# Patient Record
Sex: FEMALE | Race: WHITE | NOT HISPANIC OR LATINO | URBAN - METROPOLITAN AREA
[De-identification: names, ages, dates, MRNs, and addresses within clinical notes are randomized per-mention and may not be internally consistent; named-entity substitution may affect disease eponyms.]

---

## 2017-01-03 ENCOUNTER — EMERGENCY (EMERGENCY)
Facility: HOSPITAL | Age: 52
LOS: 1 days | Discharge: HOME | End: 2017-01-03
Attending: EMERGENCY MEDICINE | Admitting: EMERGENCY MEDICINE
Payer: COMMERCIAL

## 2017-01-03 VITALS
DIASTOLIC BLOOD PRESSURE: 78 MMHG | OXYGEN SATURATION: 97 % | HEART RATE: 62 BPM | SYSTOLIC BLOOD PRESSURE: 109 MMHG | RESPIRATION RATE: 18 BRPM | TEMPERATURE: 98 F

## 2017-01-03 VITALS
HEART RATE: 76 BPM | RESPIRATION RATE: 20 BRPM | OXYGEN SATURATION: 100 % | TEMPERATURE: 98 F | DIASTOLIC BLOOD PRESSURE: 87 MMHG | SYSTOLIC BLOOD PRESSURE: 130 MMHG

## 2017-01-03 DIAGNOSIS — R07.89 OTHER CHEST PAIN: ICD-10-CM

## 2017-01-03 LAB
ALBUMIN SERPL ELPH-MCNC: 4.4 G/DL — SIGNIFICANT CHANGE UP (ref 3.3–5)
ALP SERPL-CCNC: 121 U/L — HIGH (ref 40–120)
ALT FLD-CCNC: 18 U/L RC — SIGNIFICANT CHANGE UP (ref 10–45)
ANION GAP SERPL CALC-SCNC: 14 MMOL/L — SIGNIFICANT CHANGE UP (ref 5–17)
APTT BLD: 32.9 SEC — SIGNIFICANT CHANGE UP (ref 27.5–37.4)
AST SERPL-CCNC: 19 U/L — SIGNIFICANT CHANGE UP (ref 10–40)
BASOPHILS # BLD AUTO: 0.1 K/UL — SIGNIFICANT CHANGE UP (ref 0–0.2)
BASOPHILS NFR BLD AUTO: 1.1 % — SIGNIFICANT CHANGE UP (ref 0–2)
BILIRUB SERPL-MCNC: 0.3 MG/DL — SIGNIFICANT CHANGE UP (ref 0.2–1.2)
BUN SERPL-MCNC: 25 MG/DL — HIGH (ref 7–23)
CALCIUM SERPL-MCNC: 9.6 MG/DL — SIGNIFICANT CHANGE UP (ref 8.4–10.5)
CHLORIDE SERPL-SCNC: 100 MMOL/L — SIGNIFICANT CHANGE UP (ref 96–108)
CK MB BLD-MCNC: 1.4 % — SIGNIFICANT CHANGE UP (ref 0–3.5)
CK MB BLD-MCNC: 1.4 % — SIGNIFICANT CHANGE UP (ref 0–3.5)
CK MB CFR SERPL CALC: 2.2 NG/ML — SIGNIFICANT CHANGE UP (ref 0–3.8)
CK MB CFR SERPL CALC: 2.4 NG/ML — SIGNIFICANT CHANGE UP (ref 0–3.8)
CK SERPL-CCNC: 159 U/L — SIGNIFICANT CHANGE UP (ref 25–170)
CK SERPL-CCNC: 175 U/L — HIGH (ref 25–170)
CO2 SERPL-SCNC: 26 MMOL/L — SIGNIFICANT CHANGE UP (ref 22–31)
CREAT SERPL-MCNC: 0.96 MG/DL — SIGNIFICANT CHANGE UP (ref 0.5–1.3)
EOSINOPHIL # BLD AUTO: 0.2 K/UL — SIGNIFICANT CHANGE UP (ref 0–0.5)
EOSINOPHIL NFR BLD AUTO: 2.5 % — SIGNIFICANT CHANGE UP (ref 0–6)
GAS PNL BLDV: SIGNIFICANT CHANGE UP
GLUCOSE SERPL-MCNC: 147 MG/DL — HIGH (ref 70–99)
HCT VFR BLD CALC: 40.4 % — SIGNIFICANT CHANGE UP (ref 34.5–45)
HGB BLD-MCNC: 13.8 G/DL — SIGNIFICANT CHANGE UP (ref 11.5–15.5)
INR BLD: 1.05 RATIO — SIGNIFICANT CHANGE UP (ref 0.88–1.16)
LYMPHOCYTES # BLD AUTO: 3 K/UL — SIGNIFICANT CHANGE UP (ref 1–3.3)
LYMPHOCYTES # BLD AUTO: 37.9 % — SIGNIFICANT CHANGE UP (ref 13–44)
MCHC RBC-ENTMCNC: 30.3 PG — SIGNIFICANT CHANGE UP (ref 27–34)
MCHC RBC-ENTMCNC: 34.1 GM/DL — SIGNIFICANT CHANGE UP (ref 32–36)
MCV RBC AUTO: 88.6 FL — SIGNIFICANT CHANGE UP (ref 80–100)
MONOCYTES # BLD AUTO: 0.7 K/UL — SIGNIFICANT CHANGE UP (ref 0–0.9)
MONOCYTES NFR BLD AUTO: 9 % — SIGNIFICANT CHANGE UP (ref 2–14)
NEUTROPHILS # BLD AUTO: 3.9 K/UL — SIGNIFICANT CHANGE UP (ref 1.8–7.4)
NEUTROPHILS NFR BLD AUTO: 49.6 % — SIGNIFICANT CHANGE UP (ref 43–77)
NT-PROBNP SERPL-SCNC: 38 PG/ML — SIGNIFICANT CHANGE UP (ref 0–300)
PLATELET # BLD AUTO: 235 K/UL — SIGNIFICANT CHANGE UP (ref 150–400)
POTASSIUM SERPL-MCNC: 4.3 MMOL/L — SIGNIFICANT CHANGE UP (ref 3.5–5.3)
POTASSIUM SERPL-SCNC: 4.3 MMOL/L — SIGNIFICANT CHANGE UP (ref 3.5–5.3)
PROT SERPL-MCNC: 7.2 G/DL — SIGNIFICANT CHANGE UP (ref 6–8.3)
PROTHROM AB SERPL-ACNC: 11.4 SEC — SIGNIFICANT CHANGE UP (ref 10–13.1)
RBC # BLD: 4.55 M/UL — SIGNIFICANT CHANGE UP (ref 3.8–5.2)
RBC # FLD: 11.9 % — SIGNIFICANT CHANGE UP (ref 10.3–14.5)
SODIUM SERPL-SCNC: 140 MMOL/L — SIGNIFICANT CHANGE UP (ref 135–145)
TROPONIN T SERPL-MCNC: <0.01 NG/ML — SIGNIFICANT CHANGE UP (ref 0–0.06)
TROPONIN T SERPL-MCNC: <0.01 NG/ML — SIGNIFICANT CHANGE UP (ref 0–0.06)
WBC # BLD: 7.9 K/UL — SIGNIFICANT CHANGE UP (ref 3.8–10.5)
WBC # FLD AUTO: 7.9 K/UL — SIGNIFICANT CHANGE UP (ref 3.8–10.5)

## 2017-01-03 PROCEDURE — 85730 THROMBOPLASTIN TIME PARTIAL: CPT

## 2017-01-03 PROCEDURE — 80053 COMPREHEN METABOLIC PANEL: CPT

## 2017-01-03 PROCEDURE — 71010: CPT | Mod: 26

## 2017-01-03 PROCEDURE — 85610 PROTHROMBIN TIME: CPT

## 2017-01-03 PROCEDURE — 99284 EMERGENCY DEPT VISIT MOD MDM: CPT | Mod: 25

## 2017-01-03 PROCEDURE — 83880 ASSAY OF NATRIURETIC PEPTIDE: CPT

## 2017-01-03 PROCEDURE — 93005 ELECTROCARDIOGRAM TRACING: CPT

## 2017-01-03 PROCEDURE — 71045 X-RAY EXAM CHEST 1 VIEW: CPT

## 2017-01-03 PROCEDURE — 85014 HEMATOCRIT: CPT

## 2017-01-03 PROCEDURE — 85027 COMPLETE CBC AUTOMATED: CPT

## 2017-01-03 PROCEDURE — 82550 ASSAY OF CK (CPK): CPT

## 2017-01-03 PROCEDURE — 82553 CREATINE MB FRACTION: CPT

## 2017-01-03 PROCEDURE — 82803 BLOOD GASES ANY COMBINATION: CPT

## 2017-01-03 PROCEDURE — 84132 ASSAY OF SERUM POTASSIUM: CPT

## 2017-01-03 PROCEDURE — 99285 EMERGENCY DEPT VISIT HI MDM: CPT | Mod: 25

## 2017-01-03 PROCEDURE — 82947 ASSAY GLUCOSE BLOOD QUANT: CPT

## 2017-01-03 PROCEDURE — 93010 ELECTROCARDIOGRAM REPORT: CPT

## 2017-01-03 PROCEDURE — 84484 ASSAY OF TROPONIN QUANT: CPT

## 2017-01-03 PROCEDURE — 83605 ASSAY OF LACTIC ACID: CPT

## 2017-01-03 PROCEDURE — 82330 ASSAY OF CALCIUM: CPT

## 2017-01-03 PROCEDURE — 82435 ASSAY OF BLOOD CHLORIDE: CPT

## 2017-01-03 PROCEDURE — 84295 ASSAY OF SERUM SODIUM: CPT

## 2017-01-03 NOTE — ED ADULT NURSE NOTE - OBJECTIVE STATEMENT
51 y.o. Female presents to the ED via EMS c/o L chest and L arm discomfort x3 days. Hx asthma and GERD. 2 baby aspirins were given by EMS PTA. Pt reports feeling "discomfort on and off" for the past 2-3 days. Claims feeling a "weird feeling" on the L arm. Pt describes the feeling as a combinations of "aching and burning" feeling. Also states feeling "indigestion" when laying down. Denies pain, SOB, N/V/D, urinary/bowel complications, fever/chills. Pt is in no current distress. Comfort and safety provided.

## 2017-01-03 NOTE — ED PROVIDER NOTE - ATTENDING CONTRIBUTION TO CARE
I was physically present for the E/M service provided. I agree with above history, physical, and plan which I have reviewed and edited where appropriate. I was physically present for the key portions of the service provided.    see mdm

## 2017-01-03 NOTE — ED PROVIDER NOTE - PLAN OF CARE
Follow up with PMD in 1-2 days.  Follow up with cardiology as outpatient at phone number provided. Use tylenol as needed for pain. Tylenol 1000mg every 8 hours.  Read all handouts provided. Return to ED if you have worsening pain or further concerns.

## 2017-01-03 NOTE — ED ADULT NURSE NOTE - CHPI ED SYMPTOMS NEG
no shortness of breath/no diaphoresis/no edema/no cough/no fever/no nausea/no loss of consciousness/no palpitations/no back pain/no chills/no vomiting/no jaw pain no fever/no vomiting/no palpitations/no left arm pain/no cough/no edema/no numbness/no back pain/no loss of consciousness/no nausea/no shortness of breath/no chills/no jaw pain/no diaphoresis

## 2017-01-03 NOTE — ED ADULT NURSE REASSESSMENT NOTE - NS ED NURSE REASSESS COMMENT FT1
Pt is resting comfortably in bed. Denies pain/discomfort at this time. NAD noted. Family at bedside. Plan of care discussed w/ pt. Safety maintained.

## 2017-01-03 NOTE — ED ADULT NURSE REASSESSMENT NOTE - NS ED NURSE REASSESS COMMENT FT1
Received pt from Sherlyn GILLIAM. Pt is resting comfortably in bed. Pt states CP is intermittent and states it's a "weird feeling". Denies pain/discomfort at this time. Denies diff breathing/SOB. CM in place - NSR. B/L lungs clear, resp even, unlabored. NAD noted. VSS. Afebrile. #18G LAC, patent, no signs of redness/swelling noted to site. Safety maintained. Plan of care discussed w/ pt.

## 2017-01-03 NOTE — ED PROVIDER NOTE - MEDICAL DECISION MAKING DETAILS
50 y/o female p/w left side chest pain described as stabbing sensation which lasted a few seconds PTA and is now resolved. h/o GERD, no h/o HTN, HL or DM. EKG non-ischemic, troponin WNL x2, CXR clear. f/u [facility].

## 2017-01-03 NOTE — ED PROVIDER NOTE - PROGRESS NOTE DETAILS
Pt signed out to me. Second troponin negative.  Pt stable for dc home with cardiology follow up. -polina hayden

## 2017-01-03 NOTE — ED PROVIDER NOTE - CARE PLAN
Instructions for follow-up, activity and diet:	Follow up with PMD in 1-2 days.  Follow up with cardiology as outpatient at phone number provided. Use tylenol as needed for pain. Tylenol 1000mg every 8 hours.  Read all handouts provided. Return to ED if you have worsening pain or further concerns. Principal Discharge DX:	Chest pain  Instructions for follow-up, activity and diet:	Follow up with PMD in 1-2 days.  Follow up with cardiology as outpatient at phone number provided. Use tylenol as needed for pain. Tylenol 1000mg every 8 hours.  Read all handouts provided. Return to ED if you have worsening pain or further concerns.

## 2017-01-03 NOTE — ED PROVIDER NOTE - OBJECTIVE STATEMENT
51yF former smoker presents with L sided chest pain, burning in nature, intermittent x 2 days. Became dizzy when lying flat tonight so called EMS. Given ASA by EMS. No active chest pain. No SOB. Last stress test 3 years ago, reportedly negative. No exogenous estrogen, recent surgery or travel, h/o DVT/PE. Chest pain is not pleuritic.   PMD Rah Severino

## 2019-05-21 NOTE — ED ADULT NURSE NOTE - NS ED NURSE RECORD ANOTHER HT AND WT
"LABOR NOTE    S:  Complaints: No.  Epidural working:  yes  MD to bedside for routine cervical check    O: /81 (BP Location: Right arm, Patient Position: Lying)   Pulse 76   Temp 97.4 °F (36.3 °C) (Temporal)   Resp 16   Ht 5' 9" (1.753 m)   Wt 89.2 kg (196 lb 10.4 oz)   LMP 2018   SpO2 100%   Breastfeeding? Yes   BMI 29.04 kg/m²       FHT: 130, Cat 1 (reassuring), mod BTBV, +accels, no decels  CTX: q 2 minutes  SVE: 3/80/-2  IUPC in place    ASSESSMENT:   28 y.o.  at 38w0d, IOL    FHT reassuring    Active Hospital Problems    Diagnosis  POA    37 weeks gestation of pregnancy [Z3A.37]  Not Applicable    Gestational hypertension, third trimester [O13.3]  Unknown      Resolved Hospital Problems   No resolved problems to display.     Labor course:  0600: ft/thi/hi  0845: /-3  0945: -3, SROM clr, pit @ 18 mU/min  1200: -3, IUPC placed without difficulty, pit @ 18, MVUs 180  1400: 3/80/-2, IUPC in place, pit @ 20 mU/min, MVUs at 130s-160s    PLAN:    Continue Close Maternal/Fetal Monitoring  Pitocin Augmentation per protocol, titrate until MVUs> 200  Recheck 2 hours or PRN    Liyah Urias MD  OB/GYN  PGY-1    " No

## 2019-07-26 NOTE — ED ADULT NURSE NOTE - QUALITY
Consent (Nose)/Introductory Paragraph: The rationale for Mohs was explained to the patient and consent was obtained. The risks, benefits and alternatives to therapy were discussed in detail. Specifically, the risks of nasal deformity, changes in the flow of air through the nose, infection, scarring, bleeding, prolonged wound healing, incomplete removal, allergy to anesthesia, nerve injury and recurrence were addressed. Prior to the procedure, the treatment site was clearly identified and confirmed by the patient. All components of Universal Protocol/PAUSE Rule completed. aching/burning

## 2020-04-22 ENCOUNTER — RESULT REVIEW (OUTPATIENT)
Age: 55
End: 2020-04-22

## 2021-07-20 ENCOUNTER — RESULT REVIEW (OUTPATIENT)
Age: 56
End: 2021-07-20

## 2021-10-03 ENCOUNTER — EMERGENCY (EMERGENCY)
Facility: HOSPITAL | Age: 56
LOS: 1 days | Discharge: HOME | End: 2021-10-03
Attending: EMERGENCY MEDICINE
Payer: COMMERCIAL

## 2021-10-03 VITALS
RESPIRATION RATE: 18 BRPM | TEMPERATURE: 100 F | SYSTOLIC BLOOD PRESSURE: 115 MMHG | OXYGEN SATURATION: 96 % | WEIGHT: 147.05 LBS | DIASTOLIC BLOOD PRESSURE: 68 MMHG | HEART RATE: 94 BPM | HEIGHT: 62 IN

## 2021-10-03 VITALS
TEMPERATURE: 99 F | RESPIRATION RATE: 16 BRPM | DIASTOLIC BLOOD PRESSURE: 76 MMHG | HEART RATE: 98 BPM | OXYGEN SATURATION: 97 % | SYSTOLIC BLOOD PRESSURE: 120 MMHG

## 2021-10-03 LAB
ALBUMIN SERPL ELPH-MCNC: 4.6 G/DL — SIGNIFICANT CHANGE UP (ref 3.3–5)
ALP SERPL-CCNC: 100 U/L — SIGNIFICANT CHANGE UP (ref 40–120)
ALT FLD-CCNC: 10 U/L — SIGNIFICANT CHANGE UP (ref 10–45)
ANION GAP SERPL CALC-SCNC: 15 MMOL/L — SIGNIFICANT CHANGE UP (ref 5–17)
APPEARANCE UR: CLEAR — SIGNIFICANT CHANGE UP
AST SERPL-CCNC: 17 U/L — SIGNIFICANT CHANGE UP (ref 10–40)
BASE EXCESS BLDV CALC-SCNC: 2 MMOL/L — SIGNIFICANT CHANGE UP (ref -2–2)
BASOPHILS # BLD AUTO: 0.15 K/UL — SIGNIFICANT CHANGE UP (ref 0–0.2)
BASOPHILS NFR BLD AUTO: 0.9 % — SIGNIFICANT CHANGE UP (ref 0–2)
BILIRUB SERPL-MCNC: 0.8 MG/DL — SIGNIFICANT CHANGE UP (ref 0.2–1.2)
BILIRUB UR-MCNC: NEGATIVE — SIGNIFICANT CHANGE UP
BUN SERPL-MCNC: 16 MG/DL — SIGNIFICANT CHANGE UP (ref 7–23)
CA-I SERPL-SCNC: 1.21 MMOL/L — SIGNIFICANT CHANGE UP (ref 1.15–1.33)
CALCIUM SERPL-MCNC: 9.7 MG/DL — SIGNIFICANT CHANGE UP (ref 8.4–10.5)
CHLORIDE BLDV-SCNC: 99 MMOL/L — SIGNIFICANT CHANGE UP (ref 96–108)
CHLORIDE SERPL-SCNC: 99 MMOL/L — SIGNIFICANT CHANGE UP (ref 96–108)
CO2 BLDV-SCNC: 29 MMOL/L — HIGH (ref 22–26)
CO2 SERPL-SCNC: 22 MMOL/L — SIGNIFICANT CHANGE UP (ref 22–31)
COLOR SPEC: COLORLESS — SIGNIFICANT CHANGE UP
CREAT SERPL-MCNC: 0.84 MG/DL — SIGNIFICANT CHANGE UP (ref 0.5–1.3)
D DIMER BLD IA.RAPID-MCNC: <150 NG/ML DDU — SIGNIFICANT CHANGE UP
DIFF PNL FLD: NEGATIVE — SIGNIFICANT CHANGE UP
EOSINOPHIL # BLD AUTO: 0 K/UL — SIGNIFICANT CHANGE UP (ref 0–0.5)
EOSINOPHIL NFR BLD AUTO: 0 % — SIGNIFICANT CHANGE UP (ref 0–6)
GAS PNL BLDV: 133 MMOL/L — LOW (ref 136–145)
GAS PNL BLDV: SIGNIFICANT CHANGE UP
GLUCOSE BLDV-MCNC: 138 MG/DL — HIGH (ref 70–99)
GLUCOSE SERPL-MCNC: 137 MG/DL — HIGH (ref 70–99)
GLUCOSE UR QL: NEGATIVE — SIGNIFICANT CHANGE UP
HCG UR QL: NEGATIVE — SIGNIFICANT CHANGE UP
HCO3 BLDV-SCNC: 28 MMOL/L — SIGNIFICANT CHANGE UP (ref 22–29)
HCT VFR BLD CALC: 40.6 % — SIGNIFICANT CHANGE UP (ref 34.5–45)
HCT VFR BLDA CALC: 41 % — SIGNIFICANT CHANGE UP (ref 34.5–46.5)
HGB BLD CALC-MCNC: 13.7 G/DL — SIGNIFICANT CHANGE UP (ref 11.7–16.1)
HGB BLD-MCNC: 13.3 G/DL — SIGNIFICANT CHANGE UP (ref 11.5–15.5)
HIV 1 & 2 AB SERPL IA.RAPID: SIGNIFICANT CHANGE UP
KETONES UR-MCNC: ABNORMAL
LACTATE BLDV-MCNC: 1.3 MMOL/L — SIGNIFICANT CHANGE UP (ref 0.7–2)
LEUKOCYTE ESTERASE UR-ACNC: NEGATIVE — SIGNIFICANT CHANGE UP
LIDOCAIN IGE QN: 14 U/L — SIGNIFICANT CHANGE UP (ref 7–60)
LYMPHOCYTES # BLD AUTO: 0.28 K/UL — LOW (ref 1–3.3)
LYMPHOCYTES # BLD AUTO: 1.7 % — LOW (ref 13–44)
MANUAL SMEAR VERIFICATION: SIGNIFICANT CHANGE UP
MCHC RBC-ENTMCNC: 28.7 PG — SIGNIFICANT CHANGE UP (ref 27–34)
MCHC RBC-ENTMCNC: 32.8 GM/DL — SIGNIFICANT CHANGE UP (ref 32–36)
MCV RBC AUTO: 87.5 FL — SIGNIFICANT CHANGE UP (ref 80–100)
MONOCYTES # BLD AUTO: 0.15 K/UL — SIGNIFICANT CHANGE UP (ref 0–0.9)
MONOCYTES NFR BLD AUTO: 0.9 % — LOW (ref 2–14)
NEUTROPHILS # BLD AUTO: 15.73 K/UL — HIGH (ref 1.8–7.4)
NEUTROPHILS NFR BLD AUTO: 96.5 % — HIGH (ref 43–77)
NITRITE UR-MCNC: NEGATIVE — SIGNIFICANT CHANGE UP
NT-PROBNP SERPL-SCNC: 73 PG/ML — SIGNIFICANT CHANGE UP (ref 0–300)
PCO2 BLDV: 47 MMHG — HIGH (ref 39–42)
PH BLDV: 7.38 — SIGNIFICANT CHANGE UP (ref 7.32–7.43)
PH UR: 6.5 — SIGNIFICANT CHANGE UP (ref 5–8)
PLAT MORPH BLD: NORMAL — SIGNIFICANT CHANGE UP
PLATELET # BLD AUTO: 226 K/UL — SIGNIFICANT CHANGE UP (ref 150–400)
PO2 BLDV: 22 MMHG — LOW (ref 25–45)
POTASSIUM BLDV-SCNC: 4.2 MMOL/L — SIGNIFICANT CHANGE UP (ref 3.5–5.1)
POTASSIUM SERPL-MCNC: 4.3 MMOL/L — SIGNIFICANT CHANGE UP (ref 3.5–5.3)
POTASSIUM SERPL-SCNC: 4.3 MMOL/L — SIGNIFICANT CHANGE UP (ref 3.5–5.3)
PROT SERPL-MCNC: 7.4 G/DL — SIGNIFICANT CHANGE UP (ref 6–8.3)
PROT UR-MCNC: NEGATIVE — SIGNIFICANT CHANGE UP
RBC # BLD: 4.64 M/UL — SIGNIFICANT CHANGE UP (ref 3.8–5.2)
RBC # FLD: 12.6 % — SIGNIFICANT CHANGE UP (ref 10.3–14.5)
RBC BLD AUTO: NORMAL — SIGNIFICANT CHANGE UP
SAO2 % BLDV: 37.4 % — LOW (ref 67–88)
SARS-COV-2 RNA SPEC QL NAA+PROBE: SIGNIFICANT CHANGE UP
SODIUM SERPL-SCNC: 136 MMOL/L — SIGNIFICANT CHANGE UP (ref 135–145)
SP GR SPEC: 1.04 — HIGH (ref 1.01–1.02)
TROPONIN T, HIGH SENSITIVITY RESULT: <6 NG/L — SIGNIFICANT CHANGE UP (ref 0–51)
UROBILINOGEN FLD QL: NEGATIVE — SIGNIFICANT CHANGE UP
WBC # BLD: 16.3 K/UL — HIGH (ref 3.8–10.5)
WBC # FLD AUTO: 16.3 K/UL — HIGH (ref 3.8–10.5)

## 2021-10-03 PROCEDURE — 85018 HEMOGLOBIN: CPT

## 2021-10-03 PROCEDURE — 86703 HIV-1/HIV-2 1 RESULT ANTBDY: CPT

## 2021-10-03 PROCEDURE — 85379 FIBRIN DEGRADATION QUANT: CPT

## 2021-10-03 PROCEDURE — 93005 ELECTROCARDIOGRAM TRACING: CPT

## 2021-10-03 PROCEDURE — U0005: CPT

## 2021-10-03 PROCEDURE — 74177 CT ABD & PELVIS W/CONTRAST: CPT | Mod: MA

## 2021-10-03 PROCEDURE — 84484 ASSAY OF TROPONIN QUANT: CPT

## 2021-10-03 PROCEDURE — 84132 ASSAY OF SERUM POTASSIUM: CPT

## 2021-10-03 PROCEDURE — 87086 URINE CULTURE/COLONY COUNT: CPT

## 2021-10-03 PROCEDURE — 81003 URINALYSIS AUTO W/O SCOPE: CPT

## 2021-10-03 PROCEDURE — 81025 URINE PREGNANCY TEST: CPT

## 2021-10-03 PROCEDURE — 71045 X-RAY EXAM CHEST 1 VIEW: CPT | Mod: 26

## 2021-10-03 PROCEDURE — 82947 ASSAY GLUCOSE BLOOD QUANT: CPT

## 2021-10-03 PROCEDURE — 99284 EMERGENCY DEPT VISIT MOD MDM: CPT | Mod: 25

## 2021-10-03 PROCEDURE — 85025 COMPLETE CBC W/AUTO DIFF WBC: CPT

## 2021-10-03 PROCEDURE — 99285 EMERGENCY DEPT VISIT HI MDM: CPT | Mod: CS

## 2021-10-03 PROCEDURE — 96374 THER/PROPH/DIAG INJ IV PUSH: CPT | Mod: XU

## 2021-10-03 PROCEDURE — 83880 ASSAY OF NATRIURETIC PEPTIDE: CPT

## 2021-10-03 PROCEDURE — 96375 TX/PRO/DX INJ NEW DRUG ADDON: CPT

## 2021-10-03 PROCEDURE — 71045 X-RAY EXAM CHEST 1 VIEW: CPT

## 2021-10-03 PROCEDURE — 85014 HEMATOCRIT: CPT

## 2021-10-03 PROCEDURE — 82435 ASSAY OF BLOOD CHLORIDE: CPT

## 2021-10-03 PROCEDURE — 93010 ELECTROCARDIOGRAM REPORT: CPT

## 2021-10-03 PROCEDURE — 74177 CT ABD & PELVIS W/CONTRAST: CPT | Mod: 26,MA

## 2021-10-03 PROCEDURE — 36415 COLL VENOUS BLD VENIPUNCTURE: CPT

## 2021-10-03 PROCEDURE — 84295 ASSAY OF SERUM SODIUM: CPT

## 2021-10-03 PROCEDURE — U0003: CPT

## 2021-10-03 PROCEDURE — 82330 ASSAY OF CALCIUM: CPT

## 2021-10-03 PROCEDURE — 80053 COMPREHEN METABOLIC PANEL: CPT

## 2021-10-03 PROCEDURE — 82803 BLOOD GASES ANY COMBINATION: CPT

## 2021-10-03 PROCEDURE — 83605 ASSAY OF LACTIC ACID: CPT

## 2021-10-03 PROCEDURE — 83690 ASSAY OF LIPASE: CPT

## 2021-10-03 RX ORDER — METRONIDAZOLE 500 MG
1 TABLET ORAL
Qty: 30 | Refills: 0
Start: 2021-10-03 | End: 2021-10-12

## 2021-10-03 RX ORDER — SODIUM CHLORIDE 9 MG/ML
1000 INJECTION INTRAMUSCULAR; INTRAVENOUS; SUBCUTANEOUS ONCE
Refills: 0 | Status: COMPLETED | OUTPATIENT
Start: 2021-10-03 | End: 2021-10-03

## 2021-10-03 RX ORDER — METRONIDAZOLE 500 MG
500 TABLET ORAL ONCE
Refills: 0 | Status: COMPLETED | OUTPATIENT
Start: 2021-10-03 | End: 2021-10-03

## 2021-10-03 RX ORDER — CIPROFLOXACIN LACTATE 400MG/40ML
400 VIAL (ML) INTRAVENOUS ONCE
Refills: 0 | Status: COMPLETED | OUTPATIENT
Start: 2021-10-03 | End: 2021-10-03

## 2021-10-03 RX ORDER — CIPROFLOXACIN LACTATE 400MG/40ML
1 VIAL (ML) INTRAVENOUS
Qty: 20 | Refills: 0
Start: 2021-10-03 | End: 2021-10-12

## 2021-10-03 RX ADMIN — Medication 100 MILLIGRAM(S): at 21:39

## 2021-10-03 RX ADMIN — Medication 200 MILLIGRAM(S): at 22:24

## 2021-10-03 RX ADMIN — SODIUM CHLORIDE 1000 MILLILITER(S): 9 INJECTION INTRAMUSCULAR; INTRAVENOUS; SUBCUTANEOUS at 18:09

## 2021-10-03 NOTE — ED ADULT NURSE NOTE - OBJECTIVE STATEMENT
57 y/o female PMHX GERD, DM, presents with complaints of SOB since this morning. pt states she feels it is hard to breath and feels something is in her throat. denies any CP or Chest tightness. pt states she went to urgent care yesterday for urinary symptoms sent home with antibiotics. pt states she has mid abdominal pain radiating to bilateral flank, denies burning urination but states she feels irritation when urinating. states she had 1 episode of diarrhea today in the morning, and chills, denies any interventions. pt placed on cardiac monitor, safety precautions in place call bell in reach.

## 2021-10-03 NOTE — ED ADULT NURSE NOTE - ISOLATION TYPE:
Dominique Bass comes to the clinic for follow up anticoagulation management visit.    Patient denies any complaints related to anticoagulation therapy at this time. Patient reports no change in medication, diet or health. Reinforced with patient to call clinic with any medication changes as this can impact INR. Reinforced signs and symptoms bleeding/clotting with patient. Patient aware to seek medical care if signs and symptoms develop. Advised that if patient falls and/or hits their head, they should seek medical attention. Verbalized understanding.     Dosing instructions given to patient both verbally and in writing. Advised to call the clinic with any questions or concerns. Patient verbalized understanding. Clinic number provided.    Anticoagulation Summary  As of 2019    INR goal:   2.0-3.0   TTR:   81.6 % (3.5 y)   INR used for dosin.4 (2019)   Warfarin maintenance plan:   2.5 mg (5 mg x 0.5) every Tu, Sa; 5 mg (5 mg x 1) all other days   Weekly warfarin total:   30 mg   No change documented:   Callie Centeno RN   Plan last modified:   Amalia Mata RN (2019)   Next INR check:   2019   Priority:   Follow-Up - 4 Weeks   Target end date:   Indefinite    Indications    Atrial fibrillation (CMS/HCC) (Resolved) [I48.91]  Chronic atrial fibrillation (CMS/HCC) [I48.2]             Anticoagulation Episode Summary     INR check location:   Anticoagulation Clinic    Preferred lab:       Send INR reminders to:   ANTICOAG (OPEN ENROLLMENT) ACS CARD/EP    Comments:   Next STAC due 19; ACC; 5 mg tablets; Nutritional assessment done 16; [place]  mid May 2018; No Limitations 18; Hgb WNL (18)      Anticoagulation Care Providers     Provider Role Specialty Phone number    Mark Dentno MD  Cardiovascular Disease [phone]          Supervising provider: MAURO Cruz   None

## 2021-10-03 NOTE — ED PROVIDER NOTE - ATTENDING CONTRIBUTION TO CARE
Nemes - 55yo F h/o DM on metformin, asthma with rescue inhaler, GERD presenting to ED with SOB since this morning. "I cant take a deep breath because it triggers coughing". Pt also reports headache, nausea, diarrhea, dysuria, was seen at  yesterday and started on Macrobid for UTI. Strep throat 3 wks ago s/p 7-day course of abx. Previous covid infection august 23, 2021. No subjective fever or body aches. Recent travel from [place], no other extended period of stasis. No daily EtOH use, no longer smoking. Denies any swelling or pain in her legs. VS wnl, well appearing, in NAD. Moist mucosae, pink conjunctivae. Neck supple, neuro grossly intact. Lungs clear, cardiac wnl, no JVD. Abdomen soft/diffuse mild TTP, no CVAT. No pedal edema, no calf TTP. Poss viral syndrome, gastroenteritis, colitis, UTI/pyelo. Will get cardiac/abdominal w/u, CT abdomen, reevaluate

## 2021-10-03 NOTE — ED PROVIDER NOTE - CLINICAL SUMMARY MEDICAL DECISION MAKING FREE TEXT BOX
Suzanne Stockton, PGY-1: 57yo F h.o DMII on metformin, asthma with rescue inhaler, GERD presenting to ED with SOB since this morning. This is concerning for a UTI, PE, pneumonia or virus. will order D dimer, fluids, cxr, labs, covid swab, UA and urine culture. CT abdomen pelvis to r/o other abdominal pathology including colitis.

## 2021-10-03 NOTE — ED PROVIDER NOTE - OBJECTIVE STATEMENT
55yo F h.o DMII on metformin, GERD presenting to ED with SOB since this morning. "I cant take a deep breath". Pt also reports headache, nausea, diarrhea, dysuria, was seen at  yesterday and started on macrobid for positive ua. Strep throat 3 wks ago s/p 7-day course of abx. Previous covid infection august 23, 2021. No subjective fever or body aches. Recent travel from [place], no other extended period of stasis. No daily etOH use, no longer smoking. 55yo F h.o DMII on metformin, asthma with rescue inhaler, GERD presenting to ED with SOB since this morning. "I cant take a deep breath". Pt also reports headache, nausea, diarrhea, dysuria, was seen at  yesterday and started on macrobid for positive ua. Strep throat 3 wks ago s/p 7-day course of abx. Previous covid infection august 23, 2021. No subjective fever or body aches. Recent travel from [place], no other extended period of stasis. No daily etOH use, no longer smoking. Denies any swelling or pain in her legs.

## 2021-10-03 NOTE — ED PROVIDER NOTE - PHYSICAL EXAMINATION
GENERAL: non-toxic appearing, in NAD  HEAD: atraumatic, normocephalic  EYES: vision grossly intact, no conjunctivitis or discharge  EARS: hearing grossly intact  NOSE: no nasal discharge, epistaxis   CARDIAC: RRR, normal S1S2,  no appreciable murmurs, no cyanosis, cap refill < 2 seconds  PULM: cough, 99% spO2 on RA, CTAB, no crackles, rales, rhonchi, or wheezing  GI: soft, non-distended with mild mildline tenderness and periumbilical tenderness  NEURO: awake and alert, follows commands, normal speech, PERRLA, EOMI, no focal motor or sensory deficits, normal gait  MSK: spine appears normal, no joint swelling or erythema, no gross deformities of extremities  EXT: no peripheral edema, calf tenderness, redness or swelling  SKIN: warm, dry, and intact, no rashes  PSYCH: appropriate mood and affect

## 2021-10-03 NOTE — ED PROVIDER NOTE - NSFOLLOWUPINSTRUCTIONS_ED_ALL_ED_FT
Take ciprofloxacin 500mg twice/day x 10 days and metronidazole 500mg three times/day for 10 days.    Please  these antibiotics from your designated pharmacy.    COLITIS  Colitis is inflammation of the colon. Colitis may last a short time (be acute), or it may last a long time (become chronic).    What are the causes?  This condition may be caused by:  •Viruses.  •Bacteria.  •Reaction to medicine.  •Certain autoimmune diseases such as Crohn's disease or ulcerative colitis.    What are the signs or symptoms?  Symptoms of this condition include:  •Watery diarrhea.  •Passing bloody or tarry stool.  •Pain.  •Fever.  •Vomiting.  •Tiredness (fatigue).  •Weight loss.  •Bloating.  •Abdominal pain.  •Having fewer bowel movements than usual.  •A strong and sudden urge to have a bowel movement.  •Feeling like the bowel is not empty after a bowel movement.    HOW IS THIS CONDITION DIAGNOSED AND TREATED?  This condition is diagnosed with a stool test or a blood test.  You may also have other tests, such as:  •CT scan.  •Colonoscopy.  •Endoscopy.  •Biopsy.    How is this treated?  Treatment for this condition depends on the cause. The condition may be treated by:  •Resting the bowel. This involves not eating or drinking for a period of time.  •Fluids that are given through an IV.  •Medicine for pain and diarrhea.  •Antibiotic medicines.  •Cortisone medicines.  •Surgery.    FOLLOW THESE INSTRUCTIONS AT HOME  Eating and drinking   •Follow instructions from your health care provider about eating or drinking restrictions.  •Drink enough fluid to keep your urine pale yellow.  •Work with a dietitian to determine which foods cause your condition to flare up.  •Avoid foods that cause flare-ups.  •Eat a well-balanced diet.    General instructions   •If you were prescribed an antibiotic medicine, take it as told by your health care provider. Do not stop taking the antibiotic even if you start to feel better.  •Take over-the-counter and prescription medicines only as told by your health care provider.  •Keep all follow-up visits as told by your health care provider. This is important.    Contact a health care provider if:  •Your symptoms do not go away.  •You develop new symptoms.    Get help right away if you:  •Have a fever that does not go away with treatment.  •Develop chills  •Have extreme weakness, fainting, or dehydration.  •Have repeated vomiting.  •Develop severe pain in your abdomen.  •Pass bloody or tarry stool.    Summary  •Colitis is inflammation of the colon. Colitis may last a short time (be acute), or it may last a long time (become chronic).  •Treatment for this condition depends on the cause and may include resting the bowel, taking medicines, or having surgery.  •If you were prescribed an antibiotic medicine, take it as told by your health care provider. Do not stop taking the antibiotic even if you start to feel better.  •Get help right away if you develop severe pain in your abdomen.  •Keep all follow-up visits as told by your health care provider. This is important.    Advance activity as tolerated.  Continue all previously prescribed medications as directed unless otherwise instructed.  Follow up with your primary care physician in 48-72 hours- bring copies of your results.  Return to the ER for worsening or persistent symptoms, and/or ANY NEW OR CONCERNING SYMPTOMS. If you have issues obtaining follow up, please call: [phone] to obtain a doctor or specialist who takes your insurance in your area.  You may call [phone] to make an appointment with the internal medicine clinic.    Take ciprofloxacin 500mg twice/day x 10 days and metronidazole 500mg three times/day for 10 days.

## 2021-10-03 NOTE — ED PROVIDER NOTE - PATIENT PORTAL LINK FT
You can access the FollowMyHealth Patient Portal offered by [facility] by registering at the following website: [email or web address]. By joining [employer]s FollowMyHealth portal, you will also be able to view your health information using other applications (apps) compatible with our system.

## 2021-10-03 NOTE — ED PROVIDER NOTE - PROGRESS NOTE DETAILS
Suzanne Stockton, PGY-1  Emergency Medicine  agree with NP student evaluation, some suprapubic tenderness on exam. need for infectious workup for concern of possible worsening infection vs. pe vs. pneumonia either viral or bacterial. August Mendoza MD (PGY-2): CTAP shows uncomplicated colitis. Will give IV Cipro and Flagyl. Pt will be discharged home with Cipro and Flagyl script.

## 2021-10-03 NOTE — ED ADULT NURSE NOTE - NSIMPLEMENTINTERV_GEN_ALL_ED
Implemented All Universal Safety Interventions:  [place] to call system. Call bell, personal items and telephone within reach. Instruct patient to call for assistance. Room bathroom lighting operational. Non-slip footwear when patient is off stretcher. Physically safe environment: no spills, clutter or unnecessary equipment. Stretcher in lowest position, wheels locked, appropriate side rails in place.

## 2021-10-04 PROBLEM — K21.9 GASTRO-ESOPHAGEAL REFLUX DISEASE WITHOUT ESOPHAGITIS: Chronic | Status: ACTIVE | Noted: 2017-01-03

## 2021-10-04 RX ORDER — CIPROFLOXACIN LACTATE 400MG/40ML
1 VIAL (ML) INTRAVENOUS
Qty: 10 | Refills: 0
Start: 2021-10-04 | End: 2021-10-13

## 2021-10-04 NOTE — ED POST DISCHARGE NOTE - DETAILS
Received call from Pharmacy, ciprofloxacin on back order. Switched to Levofloxacin. Patient updated, questions answered.

## 2021-10-05 LAB
CULTURE RESULTS: NO GROWTH — SIGNIFICANT CHANGE UP
SPECIMEN SOURCE: SIGNIFICANT CHANGE UP

## 2021-11-19 PROBLEM — E11.9 TYPE 2 DIABETES MELLITUS WITHOUT COMPLICATIONS: Chronic | Status: ACTIVE | Noted: 2021-10-03

## 2021-11-30 ENCOUNTER — NON-APPOINTMENT (OUTPATIENT)
Age: 56
End: 2021-11-30

## 2021-11-30 DIAGNOSIS — Z80.3 FAMILY HISTORY OF MALIGNANT NEOPLASM OF BREAST: ICD-10-CM

## 2021-12-10 ENCOUNTER — APPOINTMENT (OUTPATIENT)
Dept: GYNECOLOGIC ONCOLOGY | Facility: CLINIC | Age: 56
End: 2021-12-10
Payer: COMMERCIAL

## 2021-12-10 VITALS
HEIGHT: 62 IN | WEIGHT: 142 LBS | HEART RATE: 81 BPM | BODY MASS INDEX: 26.13 KG/M2 | SYSTOLIC BLOOD PRESSURE: 142 MMHG | DIASTOLIC BLOOD PRESSURE: 93 MMHG

## 2021-12-10 DIAGNOSIS — Z78.9 OTHER SPECIFIED HEALTH STATUS: ICD-10-CM

## 2021-12-10 DIAGNOSIS — Z87.891 PERSONAL HISTORY OF NICOTINE DEPENDENCE: ICD-10-CM

## 2021-12-10 PROCEDURE — 99205 OFFICE O/P NEW HI 60 MIN: CPT

## 2021-12-10 RX ORDER — METFORMIN HYDROCHLORIDE 625 MG/1
TABLET ORAL
Refills: 0 | Status: ACTIVE | COMMUNITY

## 2021-12-10 NOTE — OB HISTORY
[Total Preg ___] : : [unfilled] [Abortions ___] : [unfilled] (abortions) [Vaginal ___] : [unfilled] vaginal delivery(s) [AB Spont ___] : [unfilled] miscarriage(s) [Definite ___ (Date)] : the last menstrual period was [unfilled] [Menarche Age ____] : age at menarche was [unfilled] [Menopause  Age ____] : menopause occurred at age [unfilled] [Living ___] : [unfilled] (living)

## 2021-12-11 NOTE — DISCUSSION/SUMMARY
[FreeTextEntry1] : -I met with the pt.\par -We discussed the clinical findings and nature of LGT Dz, incl the field effect of HPV in detail.\par -Disc reassuring colpo of cx and entire vagina.\par -Management options were reviewed, including my advise for an excisional biopsy; I favor a cone. The poss need for further therapy was disc (incl poss of pos margins, ca, etc). Poss role of hyst disc. \par -Periop and recuperative issues were discussed, and the poss hazards of surg reviewed. We disc VB, VD, pain mgmt, pelvic rest, f/u. \par -A diagram was drawn and a copy was provided.\par -I advised a clearance.\par -I advised review of the OSS preop.\par -Her instructions were reviewed.\par -All Q/A.\par -Orders in. We disc the timing (after the holidays, and she has a trip planned.\par \par

## 2021-12-11 NOTE — PROCEDURE
[Colposcopy] : colposcopy [Cervical Dysplasia] : cervical dysplasia [Patient] : the patient [Verbal Consent] : verbal consent was obtained prior to the procedure and is detailed in the patient's record [Site Verification] : site verification performed. [No Abnormalities] : no abnormalities seen [Direct Pressure] : direct pressure [HPV high risk] : PCR positive for high risk HPV [No Complications] : none [Tolerated Well] : the patient tolerated the procedure well [Post procedure instructions and information given] : post procedure instructions and information given and reviewed with patient. [SCJ Fully Visualized] : the squamocolumnar junction was not fully visualized [Biopsies Taken: # ___] : no biopsies were taken of the cervix [ECC Done] : an endocervical curettage was not performed

## 2021-12-11 NOTE — LETTER BODY
[Dear  ___] : Dear  [unfilled], [FreeTextEntry2] : I had the pleasure of seeing Rebecca Nava in consultation regarding the recent finding of cervical dysplasia identified on an ECC. \par \par We discussed the nature of lower genital tract disease and management options, including my advise for an excisional procedure. The possible need for further treatment and the import of ongoing and long-term surveillance was reviewed, as were perioperative issues. \par \par I will keep you informed as to her findings and disposition.

## 2021-12-11 NOTE — PHYSICAL EXAM
[de-identified] : Trace edema [de-identified] : The uterus was nonpalpable [de-identified] : The adnexa were nonpalpable

## 2021-12-11 NOTE — HISTORY OF PRESENT ILLNESS
[FreeTextEntry1] : Referring GYN - Dr. Nydia James\par PCP - Dr. Rah Osullivan\par GI - Dr. Lalo Barksdale\par Cards - Dr. Mock\par \par Ms. Nava is a 57 yo  postmenopausal female who presents for initial consultation at the request of Dr. James for the management of cervical dysplasia. She was seen by Dr. James on 7/20/21 for annual GYN exam at which time PAP revealed ASCUS, HrHPV + (16). She returned to the office for colposcopy 11/5/21 at which time ECC revealed MELISSA 2. She was referred here for further management. \par \par Tolerating PO without N/V. Denies a change in appetite or weight. Reports normal bowel and urinary function. No other associated signs or symptoms. \par \par PAP history:\par PAP on 7/20/21: ASCUS, HrHPV + (16)\par Vaginal culture on 11/10/21 - Gardnerella detected\par \par PATHOLOGY:\par 1) Vulvar biopsy, 4/22/20\par      a) squamous mucosa with moderate chronic inflammation and marked inflamed granulation tissue c/w ulcer\par 2) ECC, 11/5/21, outside facility\par     a) MELISSA 2\par \par PMHx: DM\par PSHx: N/A\par Fam Hx: maternal GM (breast cancer)\par \par \par HCM:\par Mammogram: 1/2021 - normal\par CBE: 7/2021\par SBE: performs\par Colonoscopy: 2014- normal per patient\par DEXA: never\par COVID-19 vaccine series completed\par \par

## 2021-12-14 ENCOUNTER — OUTPATIENT (OUTPATIENT)
Dept: OUTPATIENT SERVICES | Facility: HOSPITAL | Age: 56
LOS: 1 days | End: 2021-12-14
Payer: COMMERCIAL

## 2021-12-14 DIAGNOSIS — R87.810 CERVICAL HIGH RISK HUMAN PAPILLOMAVIRUS (HPV) DNA TEST POSITIVE: ICD-10-CM

## 2021-12-15 ENCOUNTER — RESULT REVIEW (OUTPATIENT)
Age: 56
End: 2021-12-15

## 2021-12-15 PROCEDURE — 88321 CONSLTJ&REPRT SLD PREP ELSWR: CPT

## 2021-12-16 LAB — SURGICAL PATHOLOGY STUDY: SIGNIFICANT CHANGE UP

## 2022-01-03 ENCOUNTER — RESULT REVIEW (OUTPATIENT)
Age: 57
End: 2022-01-03

## 2022-01-07 ENCOUNTER — NON-APPOINTMENT (OUTPATIENT)
Age: 57
End: 2022-01-07

## 2022-02-07 ENCOUNTER — OUTPATIENT (OUTPATIENT)
Dept: OUTPATIENT SERVICES | Facility: HOSPITAL | Age: 57
LOS: 1 days | End: 2022-02-07

## 2022-02-07 VITALS
WEIGHT: 138.89 LBS | HEIGHT: 63 IN | RESPIRATION RATE: 16 BRPM | DIASTOLIC BLOOD PRESSURE: 70 MMHG | OXYGEN SATURATION: 99 % | HEART RATE: 57 BPM | TEMPERATURE: 98 F | SYSTOLIC BLOOD PRESSURE: 106 MMHG

## 2022-02-07 DIAGNOSIS — N87.1 MODERATE CERVICAL DYSPLASIA: ICD-10-CM

## 2022-02-07 DIAGNOSIS — Z98.890 OTHER SPECIFIED POSTPROCEDURAL STATES: Chronic | ICD-10-CM

## 2022-02-07 DIAGNOSIS — K21.9 GASTRO-ESOPHAGEAL REFLUX DISEASE WITHOUT ESOPHAGITIS: ICD-10-CM

## 2022-02-07 DIAGNOSIS — E11.9 TYPE 2 DIABETES MELLITUS WITHOUT COMPLICATIONS: ICD-10-CM

## 2022-02-07 LAB
A1C WITH ESTIMATED AVERAGE GLUCOSE RESULT: 6.3 % — HIGH (ref 4–5.6)
ANION GAP SERPL CALC-SCNC: 9 MMOL/L — SIGNIFICANT CHANGE UP (ref 7–14)
BUN SERPL-MCNC: 17 MG/DL — SIGNIFICANT CHANGE UP (ref 7–23)
CALCIUM SERPL-MCNC: 9.9 MG/DL — SIGNIFICANT CHANGE UP (ref 8.4–10.5)
CHLORIDE SERPL-SCNC: 104 MMOL/L — SIGNIFICANT CHANGE UP (ref 98–107)
CO2 SERPL-SCNC: 26 MMOL/L — SIGNIFICANT CHANGE UP (ref 22–31)
CREAT SERPL-MCNC: 0.82 MG/DL — SIGNIFICANT CHANGE UP (ref 0.5–1.3)
ESTIMATED AVERAGE GLUCOSE: 134 — SIGNIFICANT CHANGE UP
GLUCOSE SERPL-MCNC: 119 MG/DL — HIGH (ref 70–99)
HCT VFR BLD CALC: 42.1 % — SIGNIFICANT CHANGE UP (ref 34.5–45)
HGB BLD-MCNC: 13.9 G/DL — SIGNIFICANT CHANGE UP (ref 11.5–15.5)
MCHC RBC-ENTMCNC: 29.3 PG — SIGNIFICANT CHANGE UP (ref 27–34)
MCHC RBC-ENTMCNC: 33 GM/DL — SIGNIFICANT CHANGE UP (ref 32–36)
MCV RBC AUTO: 88.6 FL — SIGNIFICANT CHANGE UP (ref 80–100)
NRBC # BLD: 0 /100 WBCS — SIGNIFICANT CHANGE UP
NRBC # FLD: 0 K/UL — SIGNIFICANT CHANGE UP
PLATELET # BLD AUTO: 260 K/UL — SIGNIFICANT CHANGE UP (ref 150–400)
POTASSIUM SERPL-MCNC: 4.5 MMOL/L — SIGNIFICANT CHANGE UP (ref 3.5–5.3)
POTASSIUM SERPL-SCNC: 4.5 MMOL/L — SIGNIFICANT CHANGE UP (ref 3.5–5.3)
RBC # BLD: 4.75 M/UL — SIGNIFICANT CHANGE UP (ref 3.8–5.2)
RBC # FLD: 12.9 % — SIGNIFICANT CHANGE UP (ref 10.3–14.5)
SODIUM SERPL-SCNC: 139 MMOL/L — SIGNIFICANT CHANGE UP (ref 135–145)
WBC # BLD: 5.36 K/UL — SIGNIFICANT CHANGE UP (ref 3.8–10.5)
WBC # FLD AUTO: 5.36 K/UL — SIGNIFICANT CHANGE UP (ref 3.8–10.5)

## 2022-02-07 RX ORDER — SODIUM CHLORIDE 9 MG/ML
1000 INJECTION, SOLUTION INTRAVENOUS
Refills: 0 | Status: DISCONTINUED | OUTPATIENT
Start: 2022-02-23 | End: 2022-03-09

## 2022-02-07 NOTE — H&P PST ADULT - PROBLEM SELECTOR PLAN 1
Pt is scheduled for cold knife cone biopsy on 2/23/22.  Verbal and written pre op instructions reviewed with patient and pt able to verbalize understanding.   Verbal and written instructions given with chlorhexidine wash, pt able to verbalize understanding via teach back method.  Pt instructed to follow surgeon's guidelines regarding COVID testing preop. Pt is scheduled for cold knife cone biopsy on 2/23/22.  Verbal and written pre op instructions reviewed with patient and pt able to verbalize understanding. Pt instructed to follow surgeon's guidelines regarding COVID testing preop. Medical and cardiac eval in chart, was requested by surgeon.

## 2022-02-07 NOTE — H&P PST ADULT - NSICDXFAMILYHX_GEN_ALL_CORE_FT
FAMILY HISTORY:  Father  Still living? Unknown  FH: diabetes mellitus, Age at diagnosis: Age Unknown    Mother  Still living? No  FH: diabetes mellitus, Age at diagnosis: Age Unknown

## 2022-02-07 NOTE — H&P PST ADULT - HISTORY OF PRESENT ILLNESS
57 yo female with preop dx. moderate cervical dysplasia presents to pre surgical testing.  Pt with abnormal PCP, +HPV, s/p colposcopy 11/2021, ECC revealing CIN2.  Pt is scheduled for cold knife cone biopsy.

## 2022-02-07 NOTE — H&P PST ADULT - NSANTHOSAYNRD_GEN_A_CORE
No. ADDI screening performed.  STOP BANG Legend: 0-2 = LOW Risk; 3-4 = INTERMEDIATE Risk; 5-8 = HIGH Risk

## 2022-02-07 NOTE — H&P PST ADULT - ATTENDING COMMENTS
Seen in [facility]SU. Planned procedure disc. Instructions reviewed, incl VB, VD, pain mgmt, pelvic rest, f/u, etc. All Q/A. Consent form reviewed.

## 2022-02-07 NOTE — H&P PST ADULT - NSICDXPASTMEDICALHX_GEN_ALL_CORE_FT
PAST MEDICAL HISTORY:  Asthma last inhaler use >1 year ago    Diabetes     GERD (gastroesophageal reflux disease)     HLD (hyperlipidemia)     Moderate cervical dysplasia

## 2022-02-07 NOTE — H&P PST ADULT - PROBLEM SELECTOR PLAN 3
Pt instructed to take omeprazole AM of surgery with a sip of water, pt able to verbalize understanding.

## 2022-02-09 ENCOUNTER — NON-APPOINTMENT (OUTPATIENT)
Age: 57
End: 2022-02-09

## 2022-02-22 ENCOUNTER — TRANSCRIPTION ENCOUNTER (OUTPATIENT)
Age: 57
End: 2022-02-22

## 2022-02-22 NOTE — ASU PATIENT PROFILE, ADULT - FALL HARM RISK - UNIVERSAL INTERVENTIONS
Bed in lowest position, wheels locked, appropriate side rails in place/Call bell, personal items and telephone in reach/Instruct patient to call for assistance before getting out of bed or chair/Non-slip footwear when patient is out of bed/[place] to call system/Physically safe environment - no spills, clutter or unnecessary equipment/Purposeful Proactive Rounding/Room/bathroom lighting operational, light cord in reach

## 2022-02-23 ENCOUNTER — OUTPATIENT (OUTPATIENT)
Dept: OUTPATIENT SERVICES | Facility: HOSPITAL | Age: 57
LOS: 1 days | Discharge: HOME | End: 2022-02-23
Payer: COMMERCIAL

## 2022-02-23 ENCOUNTER — APPOINTMENT (OUTPATIENT)
Dept: GYNECOLOGIC ONCOLOGY | Facility: HOSPITAL | Age: 57
End: 2022-02-23

## 2022-02-23 ENCOUNTER — RESULT REVIEW (OUTPATIENT)
Age: 57
End: 2022-02-23

## 2022-02-23 VITALS
SYSTOLIC BLOOD PRESSURE: 106 MMHG | OXYGEN SATURATION: 100 % | HEART RATE: 62 BPM | DIASTOLIC BLOOD PRESSURE: 85 MMHG | RESPIRATION RATE: 14 BRPM

## 2022-02-23 VITALS
SYSTOLIC BLOOD PRESSURE: 116 MMHG | OXYGEN SATURATION: 99 % | WEIGHT: 138.89 LBS | HEIGHT: 63 IN | HEART RATE: 60 BPM | RESPIRATION RATE: 16 BRPM | DIASTOLIC BLOOD PRESSURE: 67 MMHG | TEMPERATURE: 98 F

## 2022-02-23 DIAGNOSIS — Z98.890 OTHER SPECIFIED POSTPROCEDURAL STATES: Chronic | ICD-10-CM

## 2022-02-23 DIAGNOSIS — N87.1 MODERATE CERVICAL DYSPLASIA: ICD-10-CM

## 2022-02-23 LAB — GLUCOSE BLDC GLUCOMTR-MCNC: 133 MG/DL — HIGH (ref 70–99)

## 2022-02-23 PROCEDURE — 57520 CONIZATION OF CERVIX: CPT

## 2022-02-23 PROCEDURE — 88305 TISSUE EXAM BY PATHOLOGIST: CPT | Mod: 26

## 2022-02-23 PROCEDURE — 88307 TISSUE EXAM BY PATHOLOGIST: CPT | Mod: 26

## 2022-02-23 DEVICE — SURGICEL 2 X 14": Type: IMPLANTABLE DEVICE | Status: FUNCTIONAL

## 2022-02-23 RX ORDER — METFORMIN HYDROCHLORIDE 850 MG/1
1 TABLET ORAL
Qty: 0 | Refills: 0 | DISCHARGE

## 2022-02-23 RX ORDER — HYDROMORPHONE HYDROCHLORIDE 2 MG/ML
0.5 INJECTION INTRAMUSCULAR; INTRAVENOUS; SUBCUTANEOUS
Refills: 0 | Status: DISCONTINUED | OUTPATIENT
Start: 2022-02-23 | End: 2022-02-23

## 2022-02-23 RX ORDER — HYDROMORPHONE HYDROCHLORIDE 2 MG/ML
0.3 INJECTION INTRAMUSCULAR; INTRAVENOUS; SUBCUTANEOUS
Refills: 0 | Status: DISCONTINUED | OUTPATIENT
Start: 2022-02-23 | End: 2022-02-23

## 2022-02-23 RX ORDER — OMEPRAZOLE 10 MG/1
1 CAPSULE, DELAYED RELEASE ORAL
Qty: 0 | Refills: 0 | DISCHARGE

## 2022-02-23 RX ORDER — OXYCODONE HYDROCHLORIDE 5 MG/1
10 TABLET ORAL ONCE
Refills: 0 | Status: DISCONTINUED | OUTPATIENT
Start: 2022-02-23 | End: 2022-02-23

## 2022-02-23 RX ORDER — OXYCODONE HYDROCHLORIDE 5 MG/1
5 TABLET ORAL ONCE
Refills: 0 | Status: DISCONTINUED | OUTPATIENT
Start: 2022-02-23 | End: 2022-02-23

## 2022-02-23 RX ORDER — ONDANSETRON 8 MG/1
4 TABLET, FILM COATED ORAL EVERY 6 HOURS
Refills: 0 | Status: DISCONTINUED | OUTPATIENT
Start: 2022-02-23 | End: 2022-03-09

## 2022-02-23 NOTE — BRIEF OPERATIVE NOTE - OPERATION/FINDINGS
EUA-nl vulva, vagina, cervix; pm neg; uterus not palpably enlarged, mobile.; no palpable adnexal masses.   Cone fashioned intact. ECC then obtained.   Cone bed and margins cauterized. No bleeding after obs. AGNO3 applied and further obs with no bleeding. SS placed and additional observation with no bleeding. Tenac site hemostatic. Sutures cut long.   Urine clear pre- and post-op.  CC.

## 2022-02-23 NOTE — ASU DISCHARGE PLAN (ADULT/PEDIATRIC) - ACTIVITY LEVEL
Nothing per vagina No heavy lifting/Nothing per rectum/Nothing per vagina/No douching/No tampons/No intercourse

## 2022-02-23 NOTE — ASU DISCHARGE PLAN (ADULT/PEDIATRIC) - NURSING INSTRUCTIONS
You received IV Tylenol for pain management at 12 PM. Please DO NOT take any Tylenol (Acetaminophen) containing products, such as Vicodin, Percocet, Excedrin, and cold medications for the next 6 hours (until 6 PM). DO NOT TAKE MORE THAN 3000 MG OF TYLENOL in a 24 hour period. You received IV Toradol for pain management at 12:15 PM. Please DO NOT take Motrin/Ibuprofen/Advil/Aleve/NSAIDs (Non-Steroidal Anti-Inflammatory Drugs) for the next 6 hours (until 6:15 PM).

## 2022-02-23 NOTE — ASU DISCHARGE PLAN (ADULT/PEDIATRIC) - FOLLOW UP APPOINTMENTS
may also call Recovery Room (PACU) 24/7 @ [phone]/[facility], [facility]

## 2022-02-24 ENCOUNTER — NON-APPOINTMENT (OUTPATIENT)
Age: 57
End: 2022-02-24

## 2022-03-09 LAB — SURGICAL PATHOLOGY STUDY: SIGNIFICANT CHANGE UP

## 2022-03-14 ENCOUNTER — APPOINTMENT (OUTPATIENT)
Dept: GYNECOLOGIC ONCOLOGY | Facility: CLINIC | Age: 57
End: 2022-03-14
Payer: COMMERCIAL

## 2022-03-14 VITALS — TEMPERATURE: 98.2 F | DIASTOLIC BLOOD PRESSURE: 78 MMHG | SYSTOLIC BLOOD PRESSURE: 112 MMHG | HEART RATE: 85 BPM

## 2022-03-14 DIAGNOSIS — N87.1 MODERATE CERVICAL DYSPLASIA: ICD-10-CM

## 2022-03-14 PROCEDURE — 99024 POSTOP FOLLOW-UP VISIT: CPT

## 2022-03-28 ENCOUNTER — TRANSCRIPTION ENCOUNTER (OUTPATIENT)
Age: 57
End: 2022-03-28

## 2022-05-27 PROBLEM — J45.909 UNSPECIFIED ASTHMA, UNCOMPLICATED: Chronic | Status: ACTIVE | Noted: 2017-01-03

## 2022-05-27 PROBLEM — E78.5 HYPERLIPIDEMIA, UNSPECIFIED: Chronic | Status: ACTIVE | Noted: 2022-02-07

## 2022-05-27 PROBLEM — N87.1 MODERATE CERVICAL DYSPLASIA: Chronic | Status: ACTIVE | Noted: 2022-02-07

## 2022-08-01 PROBLEM — R87.610 ASCUS WITH POSITIVE HIGH RISK HPV CERVICAL: Status: ACTIVE | Noted: 2021-11-30

## 2022-08-01 PROBLEM — E11.9 DIABETES MELLITUS: Status: ACTIVE | Noted: 2021-12-03

## 2022-08-08 ENCOUNTER — APPOINTMENT (OUTPATIENT)
Dept: GYNECOLOGIC ONCOLOGY | Facility: CLINIC | Age: 57
End: 2022-08-08
Payer: COMMERCIAL

## 2022-08-08 ENCOUNTER — NON-APPOINTMENT (OUTPATIENT)
Age: 57
End: 2022-08-08

## 2022-08-08 VITALS
SYSTOLIC BLOOD PRESSURE: 121 MMHG | HEIGHT: 62 IN | DIASTOLIC BLOOD PRESSURE: 77 MMHG | HEART RATE: 69 BPM | BODY MASS INDEX: 26.68 KG/M2 | WEIGHT: 145 LBS

## 2022-08-08 DIAGNOSIS — R87.610 ATYPICAL SQUAMOUS CELLS OF UNDETERMINED SIGNIFICANCE ON CYTOLOGIC SMEAR OF CERVIX (ASC-US): ICD-10-CM

## 2022-08-08 DIAGNOSIS — R87.810 ATYPICAL SQUAMOUS CELLS OF UNDETERMINED SIGNIFICANCE ON CYTOLOGIC SMEAR OF CERVIX (ASC-US): ICD-10-CM

## 2022-08-08 DIAGNOSIS — D06.9 CARCINOMA IN SITU OF CERVIX, UNSPECIFIED: ICD-10-CM

## 2022-08-08 DIAGNOSIS — E11.9 TYPE 2 DIABETES MELLITUS W/OUT COMPLICATIONS: ICD-10-CM

## 2022-08-08 PROCEDURE — 57452 EXAM OF CERVIX W/SCOPE: CPT

## 2022-08-08 PROCEDURE — 99213 OFFICE O/P EST LOW 20 MIN: CPT | Mod: 25

## 2022-08-10 LAB — HPV HIGH+LOW RISK DNA PNL CVX: DETECTED

## 2022-08-14 LAB — CYTOLOGY CVX/VAG DOC THIN PREP: NORMAL

## 2022-08-15 ENCOUNTER — NON-APPOINTMENT (OUTPATIENT)
Age: 57
End: 2022-08-15

## 2022-10-31 ENCOUNTER — RESULT REVIEW (OUTPATIENT)
Age: 57
End: 2022-10-31

## 2023-01-27 PROBLEM — B97.7 HIGH RISK HPV INFECTION: Status: ACTIVE | Noted: 2023-01-27

## 2023-02-03 ENCOUNTER — APPOINTMENT (OUTPATIENT)
Dept: GYNECOLOGIC ONCOLOGY | Facility: CLINIC | Age: 58
End: 2023-02-03
Payer: COMMERCIAL

## 2023-02-03 VITALS
SYSTOLIC BLOOD PRESSURE: 105 MMHG | HEART RATE: 60 BPM | HEIGHT: 62 IN | BODY MASS INDEX: 28.52 KG/M2 | DIASTOLIC BLOOD PRESSURE: 72 MMHG | WEIGHT: 155 LBS

## 2023-02-03 DIAGNOSIS — B97.7 PAPILLOMAVIRUS AS THE CAUSE OF DISEASES CLASSIFIED ELSEWHERE: ICD-10-CM

## 2023-02-03 DIAGNOSIS — N87.9 DYSPLASIA OF CERVIX UTERI, UNSPECIFIED: ICD-10-CM

## 2023-02-03 DIAGNOSIS — N88.2 STRICTURE AND STENOSIS OF CERVIX UTERI: ICD-10-CM

## 2023-02-03 PROCEDURE — 99213 OFFICE O/P EST LOW 20 MIN: CPT

## 2023-02-08 LAB — CYTOLOGY CVX/VAG DOC THIN PREP: NORMAL

## 2023-02-09 ENCOUNTER — NON-APPOINTMENT (OUTPATIENT)
Age: 58
End: 2023-02-09

## (undated) DEVICE — ELCTR BALL LLETZ LG 5MM

## (undated) DEVICE — SUT SILK 2-0 18" FS

## (undated) DEVICE — ELCTR BOVIE PENCIL BLADE 10FT

## (undated) DEVICE — TUBING SUCTION NONCONDUCTIVE 6MM X 12FT

## (undated) DEVICE — DRAPE TOWEL BLUE 17" X 24"

## (undated) DEVICE — BLADE SURGICAL #15 CARBON

## (undated) DEVICE — VISITEC 4X4

## (undated) DEVICE — BLADE SURGICAL #11 CARBON

## (undated) DEVICE — GLV 6 PROTEXIS (WHITE)

## (undated) DEVICE — WARMING BLANKET FULL ADULT

## (undated) DEVICE — NDL SPINAL 22G X 3.5" (BLACK)

## (undated) DEVICE — APPLICATOR Q TIP 6" WOOD STEM

## (undated) DEVICE — LABELS BLANK W PEN

## (undated) DEVICE — SYR LUER LOK 10CC

## (undated) DEVICE — DRSG PAD SANITARY OB

## (undated) DEVICE — POSITIONER STRAP ARMBOARD VELCRO TS-30

## (undated) DEVICE — GLV 5.5 PROTEXIS

## (undated) DEVICE — BASIN SET SINGLE

## (undated) DEVICE — VENODYNE/SCD SLEEVE CALF MEDIUM

## (undated) DEVICE — MARKING PEN W RULER

## (undated) DEVICE — GOWN LG

## (undated) DEVICE — PACK PERI GYN

## (undated) DEVICE — PREP BETADINE KIT

## (undated) DEVICE — DRSG TELFA 3 X 8

## (undated) DEVICE — CONNECTOR 5 IN1 SUCTION TUBING